# Patient Record
Sex: FEMALE | Race: WHITE
[De-identification: names, ages, dates, MRNs, and addresses within clinical notes are randomized per-mention and may not be internally consistent; named-entity substitution may affect disease eponyms.]

---

## 2018-12-05 ENCOUNTER — HOSPITAL ENCOUNTER (OUTPATIENT)
Dept: HOSPITAL 75 - CARD | Age: 58
End: 2018-12-05
Attending: OBSTETRICS & GYNECOLOGY
Payer: COMMERCIAL

## 2018-12-05 DIAGNOSIS — Z12.31: Primary | ICD-10-CM

## 2018-12-05 DIAGNOSIS — I49.8: ICD-10-CM

## 2018-12-05 PROCEDURE — 93005 ELECTROCARDIOGRAM TRACING: CPT

## 2018-12-05 PROCEDURE — 77067 SCR MAMMO BI INCL CAD: CPT

## 2018-12-05 NOTE — DIAGNOSTIC IMAGING REPORT
INDICATION: Routine screening.



COMPARISON: Prior mammograms from 11/5/2015 and 8/14/2014.



EXAMINATION: 2D and 3D bilateral screening mammography was

performed with CAD.



FINDINGS: Both breasts show marked parenchymal heterogeneity and

increased density, limiting the sensitivity of mammography.

Scattered benign calcifications are noted. No mass or

malignant-appearing microcalcifications are seen. The axillae are

unremarkable.



IMPRESSION: No mammographic features suspicious for malignancy

are identified. 



ACR BI-RADS Category 2: Benign findings.

Result letter will be mailed to the patient.

Note: At least 10% of breast cancer is not imaged by mammography.



Dictated by: 



  Dictated on workstation # [id number]